# Patient Record
Sex: FEMALE | Race: WHITE | NOT HISPANIC OR LATINO | Employment: FULL TIME | URBAN - METROPOLITAN AREA
[De-identification: names, ages, dates, MRNs, and addresses within clinical notes are randomized per-mention and may not be internally consistent; named-entity substitution may affect disease eponyms.]

---

## 2019-10-25 ENCOUNTER — TELEPHONE (OUTPATIENT)
Dept: SURGERY | Facility: CLINIC | Age: 54
End: 2019-10-25

## 2019-10-25 NOTE — TELEPHONE ENCOUNTER
Patient returned call reference to a referral to Colorectal Surgery for colon cancer screening, patient refused.

## 2019-10-25 NOTE — TELEPHONE ENCOUNTER
----- Message from Marbella Dietrich sent at 10/25/2019  8:44 AM CDT -----  Regarding: [facility] appt   Hi Dr mccarty and staff,     DAYLIN Morrow is requesting a colonoscopy at the [facility] location.     Can you contact then to schedule an appt?     The order and records are in media mgr.     Thank you,  Marbella Dietrich   [facility]    [id number]

## 2019-10-25 NOTE — TELEPHONE ENCOUNTER
Called patient at all available numbers in reference to a referral to Colorectal Surgery for colon cancer screening, left message.

## 2020-11-23 ENCOUNTER — OFFICE VISIT (OUTPATIENT)
Dept: URGENT CARE | Facility: CLINIC | Age: 55
End: 2020-11-23
Payer: COMMERCIAL

## 2020-11-23 VITALS
OXYGEN SATURATION: 97 % | BODY MASS INDEX: 32.96 KG/M2 | WEIGHT: 210 LBS | TEMPERATURE: 98 F | HEIGHT: 67 IN | DIASTOLIC BLOOD PRESSURE: 86 MMHG | HEART RATE: 103 BPM | SYSTOLIC BLOOD PRESSURE: 136 MMHG | RESPIRATION RATE: 16 BRPM

## 2020-11-23 DIAGNOSIS — J01.90 ACUTE SINUSITIS, RECURRENCE NOT SPECIFIED, UNSPECIFIED LOCATION: Primary | ICD-10-CM

## 2020-11-23 DIAGNOSIS — J06.9 UPPER RESPIRATORY TRACT INFECTION, UNSPECIFIED TYPE: ICD-10-CM

## 2020-11-23 DIAGNOSIS — J40 BRONCHITIS: ICD-10-CM

## 2020-11-23 LAB
CTP QC/QA: YES
SARS-COV-2 RDRP RESP QL NAA+PROBE: NEGATIVE

## 2020-11-23 PROCEDURE — U0002: ICD-10-PCS | Mod: QW,S$GLB,, | Performed by: PHYSICIAN ASSISTANT

## 2020-11-23 PROCEDURE — 99203 OFFICE O/P NEW LOW 30 MIN: CPT | Mod: 25,S$GLB,CS, | Performed by: PHYSICIAN ASSISTANT

## 2020-11-23 PROCEDURE — U0002 COVID-19 LAB TEST NON-CDC: HCPCS | Mod: QW,S$GLB,, | Performed by: PHYSICIAN ASSISTANT

## 2020-11-23 PROCEDURE — 3008F BODY MASS INDEX DOCD: CPT | Mod: CPTII,S$GLB,, | Performed by: PHYSICIAN ASSISTANT

## 2020-11-23 PROCEDURE — 96372 PR INJECTION,THERAP/PROPH/DIAG2ST, IM OR SUBCUT: ICD-10-PCS | Mod: S$GLB,,, | Performed by: PHYSICIAN ASSISTANT

## 2020-11-23 PROCEDURE — 96372 THER/PROPH/DIAG INJ SC/IM: CPT | Mod: S$GLB,,, | Performed by: PHYSICIAN ASSISTANT

## 2020-11-23 PROCEDURE — 3008F PR BODY MASS INDEX (BMI) DOCUMENTED: ICD-10-PCS | Mod: CPTII,S$GLB,, | Performed by: PHYSICIAN ASSISTANT

## 2020-11-23 PROCEDURE — 99203 PR OFFICE/OUTPT VISIT, NEW, LEVL III, 30-44 MIN: ICD-10-PCS | Mod: 25,S$GLB,CS, | Performed by: PHYSICIAN ASSISTANT

## 2020-11-23 RX ORDER — AZITHROMYCIN 250 MG/1
TABLET, FILM COATED ORAL
Qty: 6 TABLET | Refills: 0 | Status: SHIPPED | OUTPATIENT
Start: 2020-11-23 | End: 2021-06-03 | Stop reason: CLARIF

## 2020-11-23 RX ORDER — BENZONATATE 200 MG/1
200 CAPSULE ORAL 3 TIMES DAILY PRN
Qty: 30 CAPSULE | Refills: 0 | Status: SHIPPED | OUTPATIENT
Start: 2020-11-23 | End: 2020-12-03

## 2020-11-23 RX ORDER — BETAMETHASONE SODIUM PHOSPHATE AND BETAMETHASONE ACETATE 3; 3 MG/ML; MG/ML
6 INJECTION, SUSPENSION INTRA-ARTICULAR; INTRALESIONAL; INTRAMUSCULAR; SOFT TISSUE
Status: COMPLETED | OUTPATIENT
Start: 2020-11-23 | End: 2020-11-23

## 2020-11-23 RX ORDER — ALBUTEROL SULFATE 90 UG/1
1-2 AEROSOL, METERED RESPIRATORY (INHALATION) EVERY 6 HOURS PRN
Qty: 18 G | Refills: 0 | Status: SHIPPED | OUTPATIENT
Start: 2020-11-23 | End: 2021-11-23

## 2020-11-23 RX ORDER — PROMETHAZINE HYDROCHLORIDE AND DEXTROMETHORPHAN HYDROBROMIDE 6.25; 15 MG/5ML; MG/5ML
5 SYRUP ORAL NIGHTLY PRN
Qty: 118 ML | Refills: 0 | Status: SHIPPED | OUTPATIENT
Start: 2020-11-23 | End: 2020-12-03

## 2020-11-23 RX ADMIN — BETAMETHASONE SODIUM PHOSPHATE AND BETAMETHASONE ACETATE 6 MG: 3; 3 INJECTION, SUSPENSION INTRA-ARTICULAR; INTRALESIONAL; INTRAMUSCULAR; SOFT TISSUE at 12:11

## 2020-11-23 NOTE — PROGRESS NOTES
"Subjective:       Patient ID: Stefanie Peña is a 55 y.o. female.    Vitals:  height is 5' 7" (1.702 m) and weight is 95.3 kg (210 lb). Her temperature is 97.9 °F (36.6 °C). Her blood pressure is 136/86 and her pulse is 103. Her respiration is 16 and oxygen saturation is 97%.     Chief Complaint: URI    Pt presents for covid test for work. She states that last week she began experiencing sinus congestion which has been gradually worsening since onset. Over the past few days she has developed a worsening cough. No fever or shortness of breath. She states that she has a history of recurrent bronchitis, she is a smoker. She states that typically a steroid, abx, and inhaler will help with bronchitis flares. She has taken otc tylenol cold medication with very mild relief temporarily.     URI   This is a new problem. The current episode started 1 to 4 weeks ago. The problem has been gradually worsening. There has been no fever. Associated symptoms include congestion, coughing and sinus pain. Pertinent negatives include no abdominal pain, chest pain, dysuria, ear pain, headaches, nausea, rash, sore throat, vomiting or wheezing. Treatments tried: tylenol cold and flu  The treatment provided mild relief.       Constitution: Negative for chills, sweating, fatigue, fever and unexpected weight change.   HENT: Positive for congestion, postnasal drip, sinus pain and sinus pressure. Negative for ear pain, sore throat, trouble swallowing and voice change.    Neck: Negative for painful lymph nodes.   Cardiovascular: Negative for chest pain, leg swelling, palpitations and sob on exertion.   Eyes: Negative for eye redness.   Respiratory: Positive for chest tightness, cough and sputum production. Negative for bloody sputum, COPD, shortness of breath, stridor, wheezing and asthma.    Gastrointestinal: Negative for abdominal pain, nausea and vomiting.   Genitourinary: Negative for dysuria, frequency, urgency and flank pain. "   Musculoskeletal: Negative for pain and muscle ache.   Skin: Negative for rash.   Allergic/Immunologic: Negative for seasonal allergies and asthma.   Neurological: Negative for dizziness and headaches.   Hematologic/Lymphatic: Negative for swollen lymph nodes.       Objective:      Physical Exam   Constitutional: She is oriented to person, place, and time. She appears well-developed. She is cooperative.  Non-toxic appearance. She does not appear ill. No distress.      Comments:Very pleasant sitting comfortably in nad.   HENT:   Head: Normocephalic and atraumatic.   Ears:   Right Ear: Hearing normal.   Left Ear: Hearing normal.   Nose: Mucosal edema present. No rhinorrhea, purulent discharge or nasal deformity. No epistaxis. Right sinus exhibits maxillary sinus tenderness and frontal sinus tenderness. Left sinus exhibits maxillary sinus tenderness and frontal sinus tenderness.   Mouth/Throat: Uvula is midline, oropharynx is clear and moist and mucous membranes are normal. No trismus in the jaw. Normal dentition. No uvula swelling. No oropharyngeal exudate, posterior oropharyngeal edema, posterior oropharyngeal erythema, tonsillar abscesses or cobblestoning. Tonsils are 1+ on the right. Tonsils are 1+ on the left. No tonsillar exudate.   Eyes: Conjunctivae and lids are normal. No scleral icterus.   Neck: Trachea normal, full passive range of motion without pain and phonation normal. Neck supple. No neck rigidity. No edema and no erythema present.   Cardiovascular: Normal rate, regular rhythm, normal heart sounds and normal pulses.   Pulmonary/Chest: Effort normal. No accessory muscle usage or stridor. No tachypnea and no bradypnea. No respiratory distress. She has no decreased breath sounds. She has wheezes (faint end expiratory) in the right middle field and the left middle field. She has no rhonchi. She has no rales.   No evidence of resp distress. Cough throughout exam, elicited with deep breaths.     Comments: No  evidence of resp distress. Cough throughout exam, elicited with deep breaths.     Abdominal: Normal appearance.   Musculoskeletal: Normal range of motion.         General: No deformity.   Lymphadenopathy:        Head (right side): No submandibular, no preauricular and no posterior auricular adenopathy present.        Head (left side): No submandibular, no preauricular and no posterior auricular adenopathy present.     She has no cervical adenopathy.   Neurological: She is alert and oriented to person, place, and time. She exhibits normal muscle tone. Coordination normal.   Skin: Skin is warm, dry, intact, not diaphoretic and not pale. Psychiatric: Her speech is normal and behavior is normal. Judgment and thought content normal.   Nursing note and vitals reviewed.        Results for orders placed or performed in visit on 11/23/20   POCT COVID-19 Rapid Screening   Result Value Ref Range    POC Rapid COVID Negative Negative     Acceptable Yes        Assessment:       1. Acute sinusitis, recurrence not specified, unspecified location    2. Upper respiratory tract infection, unspecified type    3. Bronchitis        Plan:         Acute sinusitis, recurrence not specified, unspecified location  -     azithromycin (Z-ZHANE) 250 MG tablet; Take 2 tablets by mouth on day 1; Take 1 tablet by mouth on days 2-5  Dispense: 6 tablet; Refill: 0    Upper respiratory tract infection, unspecified type  -     POCT COVID-19 Rapid Screening    Bronchitis  -     betamethasone acetate-betamethasone sodium phosphate injection 6 mg  -     albuterol (PROVENTIL/VENTOLIN HFA) 90 mcg/actuation inhaler; Inhale 1-2 puffs into the lungs every 6 (six) hours as needed for Wheezing. Rescue  Dispense: 18 g; Refill: 0  -     benzonatate (TESSALON) 200 MG capsule; Take 1 capsule (200 mg total) by mouth 3 (three) times daily as needed for Cough.  Dispense: 30 capsule; Refill: 0  -     promethazine-dextromethorphan (PROMETHAZINE-DM) 6.25-15  mg/5 mL Syrp; Take 5 mLs by mouth nightly as needed.  Dispense: 118 mL; Refill: 0

## 2020-11-23 NOTE — PATIENT INSTRUCTIONS
- Rest.    - Drink plenty of fluids.      - Viral upper respiratory infections typically run their course in 10-14 days.     - Tylenol or Ibuprofen as directed as needed for fever/pain. Avoid tylenol if you have a history of liver disease. Do not take ibuprofen if you have a history of GI bleeding, kidney disease, or if you take blood thinners.     - You can take over-the-counter claritin, zyrtec, allegra, or xyzal as directed. These are antihistamines that can help with runny nose, nasal congestion, sneezing, and helps to dry up post-nasal drip, which usually causes sore throat and cough.   - If you do NOT have high blood pressure, you may use a decongestant form (D)  of this medication or if you do not take the D form, you can take sudafed  (pseudoephedrine) over the counter, which is a decongestant.    - You can use Flonase (fluticasone) nasal spray as directed for sinus congestion and postnasal drip. This is a steroid nasal spray that works locally over time to decrease the inflammation in your nose/sinuses and help with allergic symptoms. This is not an quick- relief spray like afrin, but it works well if used daily.  Discontinue if you develop nose bleed  - use nasal saline prior to Flonase.    - Use Ocean Spray Nasal Saline 1-3 puffs each nostril every 2-3 hours then blow out onto tissue. This is to irrigate the nasal passage way to clear the sinus openings. Use until sinus problem resolved.    - You received a steroid shot today.  This can elevate your blood pressure, elevate your blood sugar, water weight gain, nervous energy, redness to the face and dimpling of the skin where the shot goes in.   - Do not use steroids more than 3 times per year.   - If you have diabetes, please check you blood sugar frequently.  - If you have high blood pressure, please check your blood pressure frequently.     - you can take plain Mucinex (guaifenesin) 1200 mg twice a day to help loosen mucous    -warm salt water gargles  can help with sore throat    - warm tea with honey can help with cough. Honey is a natural cough suppressant.    - use albuterol inhaler as needed, as prescribed for wheezing, shortness of breath, coughing spells, chest tightness.    - Take the tessalon (benzonatate) as needed as prescribed for cough   - Only take the promethazine- DM as directed, as needed at night for cough. This medication may cause drowsiness.     - Follow up with your PCP or specialty clinic as directed in the next 1-2 weeks if not improved or as needed.  You can call [phone] to schedule an appointment with the appropriate provider.      - Go to the ER if you develop new or worsening symptoms.     - You must understand that you have received an Urgent Care treatment only and that you may be released before all of your medical problems are known or treated.   - You, the patient, will arrange for follow up care as instructed.   - If your condition worsens or fails to improve we recommend that you receive another evaluation at the ER immediately or contact your PCP to discuss your concerns or return here.       You have tested negative for COVID-19 today.  If you did not have any close exposure as defined below, then effective today, you can return to your normal daily activities including social distancing, wearing masks, and frequent handwashing.    A close exposure is defined as anyone who had a masked or an unmasked exposure to a known COVID -19 positive person, at less than 6 ft for more than 15 minutes.  If your exposure meets this definition, then you are required to quarantine for 14 days per the CDC.    The 14 day quarantine begins from the day you were exposed, not the day of your test.  For example, if your exposure was on a Monday, and you waited until Friday of the same week to get tested and it was negative, your 14 day quarantine begins from that Monday, not the Friday you tested negative.    If you developed symptoms since  the exposure, and your test was negative today, you still have to quarantine for 14 days from the date of the exposure.    So if you meet the definition of a close exposure, A NEGATIVE TEST DOES NOT GET YOU OUT OF 14 DAYS OF QUARANTINE!

## 2021-06-03 PROBLEM — J44.1 COPD EXACERBATION: Status: ACTIVE | Noted: 2021-06-03

## 2021-06-03 PROBLEM — Z72.0 TOBACCO ABUSE: Status: ACTIVE | Noted: 2021-06-03

## 2021-06-03 PROBLEM — R09.02 HYPOXEMIA: Status: ACTIVE | Noted: 2021-06-03

## 2021-06-04 PROBLEM — J44.1 COPD EXACERBATION: Status: RESOLVED | Noted: 2021-06-03 | Resolved: 2021-06-04

## 2021-06-04 PROBLEM — R09.02 HYPOXEMIA: Status: RESOLVED | Noted: 2021-06-03 | Resolved: 2021-06-04

## 2021-06-18 ENCOUNTER — OFFICE VISIT (OUTPATIENT)
Dept: PULMONOLOGY | Facility: CLINIC | Age: 56
End: 2021-06-18
Payer: COMMERCIAL

## 2021-06-18 VITALS
WEIGHT: 216.19 LBS | BODY MASS INDEX: 33.93 KG/M2 | SYSTOLIC BLOOD PRESSURE: 157 MMHG | OXYGEN SATURATION: 96 % | HEIGHT: 67 IN | DIASTOLIC BLOOD PRESSURE: 89 MMHG | HEART RATE: 92 BPM

## 2021-06-18 DIAGNOSIS — R91.8 GROUND GLASS OPACITY PRESENT ON IMAGING OF LUNG: Primary | ICD-10-CM

## 2021-06-18 DIAGNOSIS — Z72.0 TOBACCO ABUSE: ICD-10-CM

## 2021-06-18 DIAGNOSIS — J43.2 CENTRILOBULAR EMPHYSEMA: ICD-10-CM

## 2021-06-18 DIAGNOSIS — R06.09 DYSPNEA ON EXERTION: ICD-10-CM

## 2021-06-18 PROCEDURE — 3008F BODY MASS INDEX DOCD: CPT | Mod: CPTII,S$GLB,, | Performed by: NURSE PRACTITIONER

## 2021-06-18 PROCEDURE — 1111F PR DISCHARGE MEDS RECONCILED W/ CURRENT OUTPATIENT MED LIST: ICD-10-PCS | Mod: CPTII,S$GLB,, | Performed by: NURSE PRACTITIONER

## 2021-06-18 PROCEDURE — 3008F PR BODY MASS INDEX (BMI) DOCUMENTED: ICD-10-PCS | Mod: CPTII,S$GLB,, | Performed by: NURSE PRACTITIONER

## 2021-06-18 PROCEDURE — 99204 PR OFFICE/OUTPT VISIT, NEW, LEVL IV, 45-59 MIN: ICD-10-PCS | Mod: S$GLB,,, | Performed by: NURSE PRACTITIONER

## 2021-06-18 PROCEDURE — 99999 PR PBB SHADOW E&M-EST. PATIENT-LVL IV: CPT | Mod: PBBFAC,,, | Performed by: NURSE PRACTITIONER

## 2021-06-18 PROCEDURE — 1111F DSCHRG MED/CURRENT MED MERGE: CPT | Mod: CPTII,S$GLB,, | Performed by: NURSE PRACTITIONER

## 2021-06-18 PROCEDURE — 99204 OFFICE O/P NEW MOD 45 MIN: CPT | Mod: S$GLB,,, | Performed by: NURSE PRACTITIONER

## 2021-06-18 PROCEDURE — 99999 PR PBB SHADOW E&M-EST. PATIENT-LVL IV: ICD-10-PCS | Mod: PBBFAC,,, | Performed by: NURSE PRACTITIONER

## 2021-06-18 PROCEDURE — 1126F AMNT PAIN NOTED NONE PRSNT: CPT | Mod: S$GLB,,, | Performed by: NURSE PRACTITIONER

## 2021-06-18 PROCEDURE — 1126F PR PAIN SEVERITY QUANTIFIED, NO PAIN PRESENT: ICD-10-PCS | Mod: S$GLB,,, | Performed by: NURSE PRACTITIONER

## 2021-06-18 RX ORDER — MULTIVITAMIN
1 TABLET ORAL DAILY
COMMUNITY

## 2021-06-18 RX ORDER — PROMETHAZINE HYDROCHLORIDE AND DEXTROMETHORPHAN HYDROBROMIDE 6.25; 15 MG/5ML; MG/5ML
SYRUP ORAL
COMMUNITY

## 2021-06-29 RX ORDER — BUDESONIDE, GLYCOPYRROLATE, AND FORMOTEROL FUMARATE 160; 9; 4.8 UG/1; UG/1; UG/1
2 AEROSOL, METERED RESPIRATORY (INHALATION) 2 TIMES DAILY
Qty: 10.7 G | Refills: 11 | Status: SHIPPED | OUTPATIENT
Start: 2021-06-29 | End: 2022-05-25 | Stop reason: SDUPTHER

## 2021-09-20 DIAGNOSIS — R91.1 SOLITARY PULMONARY NODULE: ICD-10-CM

## 2021-09-21 ENCOUNTER — PATIENT MESSAGE (OUTPATIENT)
Dept: PULMONOLOGY | Facility: CLINIC | Age: 56
End: 2021-09-21

## 2022-03-17 ENCOUNTER — OFFICE VISIT (OUTPATIENT)
Dept: OBSTETRICS AND GYNECOLOGY | Facility: CLINIC | Age: 57
End: 2022-03-17
Payer: COMMERCIAL

## 2022-03-17 VITALS — DIASTOLIC BLOOD PRESSURE: 80 MMHG | WEIGHT: 215.81 LBS | BODY MASS INDEX: 33.8 KG/M2 | SYSTOLIC BLOOD PRESSURE: 126 MMHG

## 2022-03-17 DIAGNOSIS — N89.8 VAGINAL ODOR: ICD-10-CM

## 2022-03-17 DIAGNOSIS — R31.9 HEMATURIA, UNSPECIFIED TYPE: ICD-10-CM

## 2022-03-17 DIAGNOSIS — R35.0 URINARY FREQUENCY: ICD-10-CM

## 2022-03-17 DIAGNOSIS — Z01.419 ENCOUNTER FOR WELL WOMAN EXAM WITH ROUTINE GYNECOLOGICAL EXAM: Primary | ICD-10-CM

## 2022-03-17 LAB
BILIRUB SERPL-MCNC: ABNORMAL MG/DL
BLOOD, POC UA: ABNORMAL
GLUCOSE UR QL STRIP: NORMAL
KETONES UR QL STRIP: ABNORMAL
LEUKOCYTE ESTERASE URINE, POC: ABNORMAL
NITRITE, POC UA: ABNORMAL
PH, POC UA: 5
PROTEIN, POC: ABNORMAL
SPECIFIC GRAVITY, POC UA: 1
UROBILINOGEN, POC UA: NORMAL

## 2022-03-17 PROCEDURE — 87481 CANDIDA DNA AMP PROBE: CPT | Mod: 59 | Performed by: NURSE PRACTITIONER

## 2022-03-17 PROCEDURE — 3074F SYST BP LT 130 MM HG: CPT | Mod: CPTII,S$GLB,, | Performed by: NURSE PRACTITIONER

## 2022-03-17 PROCEDURE — 3008F BODY MASS INDEX DOCD: CPT | Mod: CPTII,S$GLB,, | Performed by: NURSE PRACTITIONER

## 2022-03-17 PROCEDURE — 1159F PR MEDICATION LIST DOCUMENTED IN MEDICAL RECORD: ICD-10-PCS | Mod: CPTII,S$GLB,, | Performed by: NURSE PRACTITIONER

## 2022-03-17 PROCEDURE — 1160F PR REVIEW ALL MEDS BY PRESCRIBER/CLIN PHARMACIST DOCUMENTED: ICD-10-PCS | Mod: CPTII,S$GLB,, | Performed by: NURSE PRACTITIONER

## 2022-03-17 PROCEDURE — 3079F DIAST BP 80-89 MM HG: CPT | Mod: CPTII,S$GLB,, | Performed by: NURSE PRACTITIONER

## 2022-03-17 PROCEDURE — 99386 PR PREVENTIVE VISIT,NEW,40-64: ICD-10-PCS | Mod: 25,S$GLB,, | Performed by: NURSE PRACTITIONER

## 2022-03-17 PROCEDURE — 3079F PR MOST RECENT DIASTOLIC BLOOD PRESSURE 80-89 MM HG: ICD-10-PCS | Mod: CPTII,S$GLB,, | Performed by: NURSE PRACTITIONER

## 2022-03-17 PROCEDURE — 87186 SC STD MICRODIL/AGAR DIL: CPT | Performed by: NURSE PRACTITIONER

## 2022-03-17 PROCEDURE — 99999 PR PBB SHADOW E&M-EST. PATIENT-LVL III: ICD-10-PCS | Mod: PBBFAC,,, | Performed by: NURSE PRACTITIONER

## 2022-03-17 PROCEDURE — 3008F PR BODY MASS INDEX (BMI) DOCUMENTED: ICD-10-PCS | Mod: CPTII,S$GLB,, | Performed by: NURSE PRACTITIONER

## 2022-03-17 PROCEDURE — 1159F MED LIST DOCD IN RCRD: CPT | Mod: CPTII,S$GLB,, | Performed by: NURSE PRACTITIONER

## 2022-03-17 PROCEDURE — 87077 CULTURE AEROBIC IDENTIFY: CPT | Performed by: NURSE PRACTITIONER

## 2022-03-17 PROCEDURE — 87086 URINE CULTURE/COLONY COUNT: CPT | Performed by: NURSE PRACTITIONER

## 2022-03-17 PROCEDURE — 81003 URINALYSIS AUTO W/O SCOPE: CPT | Mod: QW,S$GLB,, | Performed by: NURSE PRACTITIONER

## 2022-03-17 PROCEDURE — 3074F PR MOST RECENT SYSTOLIC BLOOD PRESSURE < 130 MM HG: ICD-10-PCS | Mod: CPTII,S$GLB,, | Performed by: NURSE PRACTITIONER

## 2022-03-17 PROCEDURE — 87624 HPV HI-RISK TYP POOLED RSLT: CPT | Performed by: NURSE PRACTITIONER

## 2022-03-17 PROCEDURE — 81003 POCT URINALYSIS: ICD-10-PCS | Mod: QW,S$GLB,, | Performed by: NURSE PRACTITIONER

## 2022-03-17 PROCEDURE — 87088 URINE BACTERIA CULTURE: CPT | Performed by: NURSE PRACTITIONER

## 2022-03-17 PROCEDURE — 88175 CYTOPATH C/V AUTO FLUID REDO: CPT | Performed by: NURSE PRACTITIONER

## 2022-03-17 PROCEDURE — 99386 PREV VISIT NEW AGE 40-64: CPT | Mod: 25,S$GLB,, | Performed by: NURSE PRACTITIONER

## 2022-03-17 PROCEDURE — 1160F RVW MEDS BY RX/DR IN RCRD: CPT | Mod: CPTII,S$GLB,, | Performed by: NURSE PRACTITIONER

## 2022-03-17 PROCEDURE — 99999 PR PBB SHADOW E&M-EST. PATIENT-LVL III: CPT | Mod: PBBFAC,,, | Performed by: NURSE PRACTITIONER

## 2022-03-17 RX ORDER — TIOTROPIUM BROMIDE 18 UG/1
CAPSULE ORAL; RESPIRATORY (INHALATION) DAILY
COMMUNITY
Start: 2022-02-24

## 2022-03-17 NOTE — PROGRESS NOTES
Chief Complaint: Well Woman Exam     HPI:      Stefanie Peña is a 56 y.o.  who presents today for well woman exam. Patient is postmenopausal.  Denies PMB.  Patient is not currently sexually active. She declines STD screening today. Ms. Peña confirms that she is safe at home.  Ms. Peña denies abnormal vaginal bleeding, discharge, pelvic pain, or changes in appetite.  Complains of intermittent vaginal odor. Denies discharge or itching.  Complains of frequent urination. Denies dysuria and hematuria.     Previous Pap:  no abnormalities () Reports hx of abnormal paps that was follow by an outpatient procedure. Patient only knows that the MD 'took 3/4 of her cervix.' The procedure was in 8433-0250 at [facility].  Previous Mammogram: WNL per pt. Completed at [facility] (10/2021)   Colonoscopy: cologuard negative (2021)    Family History   Problem Relation Age of Onset    Breast cancer Neg Hx     Colon cancer Neg Hx     Ovarian cancer Neg Hx      OB History        2    Para   2    Term   2            AB        Living           SAB        IAB        Ectopic        Multiple        Live Births                     ROS:     GENERAL: Denies unintentional weight gain or weight loss. Feeling well overall.   BREASTS: Denies pain, lumps, or nipple discharge.   ABDOMEN: Denies abdominal pain, constipation, diarrhea, nausea, vomiting, change in appetite.  URINARY: Denies dysuria, hematuria.  PSYCHIATRIC: Denies depression, anxiety or mood swings.    Physical Exam:      PHYSICAL EXAM:  /80   Wt 97.9 kg (215 lb 13.3 oz)   BMI 33.80 kg/m²   Body mass index is 33.8 kg/m².     APPEARANCE: Well nourished, well developed, in no acute distress.  PSYCH: Appropriate mood and affect.  ABDOMEN: Soft.  No tenderness or masses.    BREASTS: Symmetrical, no skin changes or visible lesions.  No palpable masses or nipple discharge bilaterally.  PELVIC: Normal external genitalia without lesions.  Normal hair  distribution.  Adequate perineal body, normal urethral meatus.  Vagina atrophic without lesions or discharge.  Cervix appears absent with no clear cervical os. Possible vaginal cuff?   No significant cystocele or rectocele.  Uterus and cervix not identified on bimanual. Bimanual exam shows Adnexa without masses or tenderness.      Assessment/Plan:     Encounter for well woman exam with routine gynecological exam  -     Liquid-Based Pap Smear, Screening  -     HPV High Risk Genotypes, PCR    Vaginal odor  -     Vaginosis Screen by DNA Probe    Urinary frequency  -     POCT Urinalysis        Counseling:     Patient was counseled today on current ASCCP pap guidelines, the recommendation for yearly pelvic exams, healthy diet and exercise routines, breast self awareness and annual mammograms.She is to see her PCP for other health maintenance.     Request records from [facility] to determine exact procedure completed.

## 2022-03-18 DIAGNOSIS — N39.0 URINARY TRACT INFECTION WITH HEMATURIA, SITE UNSPECIFIED: Primary | ICD-10-CM

## 2022-03-18 DIAGNOSIS — R31.9 URINARY TRACT INFECTION WITH HEMATURIA, SITE UNSPECIFIED: Primary | ICD-10-CM

## 2022-03-18 LAB
BACTERIAL VAGINOSIS DNA: NEGATIVE
CANDIDA GLABRATA DNA: NEGATIVE
CANDIDA KRUSEI DNA: NEGATIVE
CANDIDA RRNA VAG QL PROBE: NEGATIVE
T VAGINALIS RRNA GENITAL QL PROBE: NEGATIVE

## 2022-03-18 RX ORDER — NITROFURANTOIN 25; 75 MG/1; MG/1
100 CAPSULE ORAL 2 TIMES DAILY
Qty: 10 CAPSULE | Refills: 0 | Status: SHIPPED | OUTPATIENT
Start: 2022-03-18 | End: 2022-03-23

## 2022-03-20 LAB — BACTERIA UR CULT: ABNORMAL

## 2022-03-24 LAB
CLINICAL INFO: NORMAL
CYTO CVX: NORMAL
CYTOLOGIST CVX/VAG CYTO: NORMAL
CYTOLOGIST CVX/VAG CYTO: NORMAL
CYTOLOGY CMNT CVX/VAG CYTO-IMP: NORMAL
CYTOLOGY PAP THIN PREP EXPLANATION: NORMAL
DATE OF PREVIOUS PAP: NORMAL
DATE PREVIOUS BX: YES
GEN CATEG CVX/VAG CYTO-IMP: NORMAL
HPV I/H RISK 4 DNA CVX QL NAA+PROBE: NOT DETECTED
LMP START DATE: NORMAL
MICROORGANISM CVX/VAG CYTO: NORMAL
PATHOLOGIST CVX/VAG CYTO: NORMAL
SERVICE CMNT-IMP: NORMAL
SPECIMEN SOURCE CVX/VAG CYTO: NORMAL
STAT OF ADQ CVX/VAG CYTO-IMP: NORMAL

## 2022-05-25 ENCOUNTER — PATIENT MESSAGE (OUTPATIENT)
Dept: CARDIOLOGY | Facility: CLINIC | Age: 57
End: 2022-05-25
Payer: COMMERCIAL

## 2022-05-25 RX ORDER — BUDESONIDE, GLYCOPYRROLATE, AND FORMOTEROL FUMARATE 160; 9; 4.8 UG/1; UG/1; UG/1
2 AEROSOL, METERED RESPIRATORY (INHALATION) 2 TIMES DAILY
Qty: 10.7 G | Refills: 11 | Status: CANCELLED | OUTPATIENT
Start: 2022-05-25

## 2022-05-25 RX ORDER — BUDESONIDE, GLYCOPYRROLATE, AND FORMOTEROL FUMARATE 160; 9; 4.8 UG/1; UG/1; UG/1
2 AEROSOL, METERED RESPIRATORY (INHALATION) 2 TIMES DAILY
Qty: 10.7 G | Refills: 11 | Status: SHIPPED | OUTPATIENT
Start: 2022-05-25 | End: 2023-09-24 | Stop reason: SDUPTHER

## 2023-09-25 RX ORDER — BUDESONIDE, GLYCOPYRROLATE, AND FORMOTEROL FUMARATE 160; 9; 4.8 UG/1; UG/1; UG/1
2 AEROSOL, METERED RESPIRATORY (INHALATION) 2 TIMES DAILY
Qty: 10.7 G | Refills: 11 | Status: SHIPPED | OUTPATIENT
Start: 2023-09-25

## 2024-08-19 ENCOUNTER — HOSPITAL ENCOUNTER (OUTPATIENT)
Dept: RADIOLOGY | Facility: HOSPITAL | Age: 59
Discharge: HOME | End: 2024-08-19
Attending: NURSE PRACTITIONER
Payer: COMMERCIAL

## 2024-08-19 DIAGNOSIS — Z12.31 ENCOUNTER FOR SCREENING MAMMOGRAM FOR BREAST CANCER: ICD-10-CM

## 2024-08-19 PROCEDURE — 77067 SCR MAMMO BI INCL CAD: CPT | Mod: TC

## 2024-09-03 ENCOUNTER — OFFICE VISIT (OUTPATIENT)
Dept: OBSTETRICS AND GYNECOLOGY | Facility: CLINIC | Age: 59
End: 2024-09-03
Payer: COMMERCIAL

## 2024-09-03 VITALS
HEIGHT: 67 IN | SYSTOLIC BLOOD PRESSURE: 142 MMHG | WEIGHT: 189.13 LBS | BODY MASS INDEX: 29.69 KG/M2 | HEART RATE: 74 BPM | DIASTOLIC BLOOD PRESSURE: 100 MMHG

## 2024-09-03 DIAGNOSIS — Z01.419 WELL WOMAN EXAM WITH ROUTINE GYNECOLOGICAL EXAM: Primary | ICD-10-CM

## 2024-09-03 DIAGNOSIS — Z12.4 SCREENING FOR CERVICAL CANCER: ICD-10-CM

## 2024-09-03 DIAGNOSIS — N95.1 MENOPAUSAL SYMPTOMS: ICD-10-CM

## 2024-09-03 DIAGNOSIS — Z76.89 ENCOUNTER TO ESTABLISH CARE: ICD-10-CM

## 2024-09-03 DIAGNOSIS — Z87.42 H/O ABNORMAL CERVICAL PAPANICOLAOU SMEAR: ICD-10-CM

## 2024-09-03 DIAGNOSIS — Z11.3 SCREENING FOR STDS (SEXUALLY TRANSMITTED DISEASES): ICD-10-CM

## 2024-09-03 DIAGNOSIS — R35.0 URINATION FREQUENCY: ICD-10-CM

## 2024-09-03 LAB
BILIRUB SERPL-MCNC: NORMAL MG/DL
BLOOD URINE, POC: NORMAL
CLARITY, POC UA: CLEAR
COLOR, POC UA: YELLOW
GLUCOSE UR QL STRIP: NORMAL
KETONES UR QL STRIP: NORMAL
LEUKOCYTE ESTERASE URINE, POC: NORMAL
NITRITE, POC UA: NORMAL
PH, POC UA: 7.5
PROTEIN, POC: NORMAL
SPECIFIC GRAVITY, POC UA: 1.01
UROBILINOGEN, POC UA: 0.2

## 2024-09-03 PROCEDURE — 1160F RVW MEDS BY RX/DR IN RCRD: CPT | Mod: CPTII,S$GLB,, | Performed by: OBSTETRICS & GYNECOLOGY

## 2024-09-03 PROCEDURE — 87491 CHLMYD TRACH DNA AMP PROBE: CPT | Performed by: OBSTETRICS & GYNECOLOGY

## 2024-09-03 PROCEDURE — 81002 URINALYSIS NONAUTO W/O SCOPE: CPT | Mod: S$GLB,,, | Performed by: OBSTETRICS & GYNECOLOGY

## 2024-09-03 PROCEDURE — 3077F SYST BP >= 140 MM HG: CPT | Mod: CPTII,S$GLB,, | Performed by: OBSTETRICS & GYNECOLOGY

## 2024-09-03 PROCEDURE — 99999 PR PBB SHADOW E&M-EST. PATIENT-LVL V: CPT | Mod: PBBFAC,,, | Performed by: OBSTETRICS & GYNECOLOGY

## 2024-09-03 PROCEDURE — 1159F MED LIST DOCD IN RCRD: CPT | Mod: CPTII,S$GLB,, | Performed by: OBSTETRICS & GYNECOLOGY

## 2024-09-03 PROCEDURE — 87624 HPV HI-RISK TYP POOLED RSLT: CPT | Performed by: OBSTETRICS & GYNECOLOGY

## 2024-09-03 PROCEDURE — 99396 PREV VISIT EST AGE 40-64: CPT | Mod: S$GLB,,, | Performed by: OBSTETRICS & GYNECOLOGY

## 2024-09-03 PROCEDURE — 3080F DIAST BP >= 90 MM HG: CPT | Mod: CPTII,S$GLB,, | Performed by: OBSTETRICS & GYNECOLOGY

## 2024-09-03 PROCEDURE — 3008F BODY MASS INDEX DOCD: CPT | Mod: CPTII,S$GLB,, | Performed by: OBSTETRICS & GYNECOLOGY

## 2024-09-03 RX ORDER — NITROFURANTOIN 25; 75 MG/1; MG/1
CAPSULE ORAL
COMMUNITY

## 2024-09-03 RX ORDER — MECLIZINE HYDROCHLORIDE 25 MG/1
TABLET ORAL
COMMUNITY

## 2024-09-03 RX ORDER — PREDNISONE 20 MG/1
TABLET ORAL
COMMUNITY

## 2024-09-03 RX ORDER — ALBUTEROL SULFATE 90 UG/1
INHALANT RESPIRATORY (INHALATION)
COMMUNITY

## 2024-09-03 RX ORDER — DOXYCYCLINE 100 MG/1
1 CAPSULE ORAL 2 TIMES DAILY
COMMUNITY

## 2024-09-03 RX ORDER — IBUPROFEN 800 MG/1
1 TABLET ORAL EVERY 6 HOURS PRN
COMMUNITY

## 2024-09-03 RX ORDER — BENZONATATE 100 MG/1
CAPSULE ORAL
COMMUNITY

## 2024-09-03 RX ORDER — CICLOPIROX 80 MG/ML
SOLUTION TOPICAL
COMMUNITY

## 2024-09-03 RX ORDER — BENZONATATE 200 MG/1
CAPSULE ORAL
COMMUNITY

## 2024-09-03 RX ORDER — AMOXICILLIN 500 MG/1
TABLET, FILM COATED ORAL
COMMUNITY
Start: 2024-04-05

## 2024-09-03 NOTE — PROGRESS NOTES
"Subjective     Patient ID: Stefanie Peña is a 58 y.o. female.    Chief Complaint:  Well Woman      History of Present Illness  HPI  57yo  presents for annual exam.  Overall doing well.  On last exam, patient noted to have possibly no cervix, just vaginal cuff.  Upon further questioning, patient reports h/o partial removal of cervix in , but she is uncertain what all that entailed.  Believes she still has uterus and ovaries, but is unsure.  Perimenopausal sx since age 47, stopped having cycles in early 50s.  Still has occ HF/NS, night sweats worse when she is working at night and physically active.  Also reports urinary frequency.  Denies dysuria or incontinence.  No PMB.  Not sexually active.  No other complaints today.  Up to date on MMG.  Reports cologuard in , has appt with new PCP , but would prefer to switch to [facility].     GYN & OB History  No LMP recorded. Patient is postmenopausal.   Date of Last Pap: 3/24/2022    OB History    Para Term  AB Living   2 2 2         SAB IAB Ectopic Multiple Live Births                  # Outcome Date GA Lbr Andi/2nd Weight Sex Type Anes PTL Lv   2 Term            1 Term                Review of Systems  Review of Systems   Constitutional:  Negative for chills and fever.   Respiratory:  Negative for cough and shortness of breath.    Cardiovascular:  Negative for chest pain.   Gastrointestinal:  Positive for constipation (diet related). Negative for abdominal pain and diarrhea.   Genitourinary:  Positive for frequency. Negative for dysuria, pelvic pain, vaginal discharge, postmenopausal bleeding and vaginal dryness.   Musculoskeletal:  Negative for myalgias.   Neurological:  Negative for headaches.          Objective   Physical Exam    BP (!) 142/100 (BP Location: Left arm, Patient Position: Sitting, BP Method: Large (Automatic))   Pulse 74   Ht 5' 7" (1.702 m)   Wt 85.8 kg (189 lb 2.5 oz)   BMI 29.63 kg/m²     Gen: NAD  Resp: Normal " respiratory effort  Breast: Symmetric, nontender.  No masses.  No skin changes.  No nipple discharge.   Abd: soft, NT  Pelvic: Normal-appearing external female genitalia.  Atrophic cervix flushed with vaginal wall noted vs possible cuff, as previously documented exam.  However, possible small uterus noted on bimanual exam, nontender.  Slight fullness noted in RLQ.  No adnexal masses or tenderness.    Ext: normal ROM  Psych: appropriate affect  Neuro: grossly intact       Assessment and Plan   Stefanie was seen today for well woman.    Diagnoses and all orders for this visit:    Well woman exam with routine gynecological exam    H/O abnormal cervical Papanicolaou smear  -     US Pelvis Comp with Transvag NON-OB (xpd; Future    Menopausal symptoms  -     US Pelvis Comp with Transvag NON-OB (xpd; Future    Urination frequency  -     POCT URINE DIPSTICK WITHOUT MICROSCOPE    Screening for STDs (sexually transmitted diseases)  -     HIV 1/2 Ag/Ab (4th Gen); Future  -     Treponema Pallidium Antibodies IgG, IgM; Future  -     C. trachomatis/N. gonorrhoeae by AMP DNA [facility]; Vagina    Encounter to establish care  -     Ambulatory referral/consult to Internal Medicine; Future    Screening for cervical cancer  -     Liquid-Based Pap Smear, Screening  -     HPV High Risk Genotypes, PCR          Plan:  Routine annual exam with pap smear and breast exam today.  Discussed exam with patient - very atrophic cervix vs cuff noted, will get pelvic US to further evaluate status of uterus and cervix, as well as visualize adnexa due to fullness noted in RLQ.    STD screening today.  Will place referral to PCP through [facility].   Discussed menopausal symptoms.  Discussed r/b/a of HRT.  Will await results of US (will need progesterone if uterus still present).  Can treat if sx become too bothersome.   Counseling done, precautions given, all questions answered.  RTC 1 year for annual, or prn.

## 2024-09-04 LAB
C TRACH DNA SPEC QL NAA+PROBE: NOT DETECTED
N GONORRHOEA DNA SPEC QL NAA+PROBE: NOT DETECTED

## 2024-09-19 ENCOUNTER — PATIENT MESSAGE (OUTPATIENT)
Dept: PRIMARY CARE CLINIC | Facility: CLINIC | Age: 59
End: 2024-09-19
Payer: COMMERCIAL

## 2024-10-08 DIAGNOSIS — J43.2 CENTRILOBULAR EMPHYSEMA: Primary | ICD-10-CM

## 2024-10-08 RX ORDER — BUDESONIDE, GLYCOPYRROLATE, AND FORMOTEROL FUMARATE 160; 9; 4.8 UG/1; UG/1; UG/1
2 AEROSOL, METERED RESPIRATORY (INHALATION) 2 TIMES DAILY
Qty: 10.7 G | Refills: 4 | Status: SHIPPED | OUTPATIENT
Start: 2024-10-08

## 2024-10-22 ENCOUNTER — PATIENT MESSAGE (OUTPATIENT)
Dept: RESEARCH | Facility: HOSPITAL | Age: 59
End: 2024-10-22
Payer: COMMERCIAL

## 2024-11-04 ENCOUNTER — NURSE TRIAGE (OUTPATIENT)
Dept: ADMINISTRATIVE | Facility: CLINIC | Age: 59
End: 2024-11-04
Payer: COMMERCIAL

## 2024-11-04 ENCOUNTER — TELEPHONE (OUTPATIENT)
Dept: PRIMARY CARE CLINIC | Facility: CLINIC | Age: 59
End: 2024-11-04
Payer: COMMERCIAL

## 2024-11-04 NOTE — TELEPHONE ENCOUNTER
Pt called with c/o intermittent chest pain, elevated BP and SOB. Pt reports last /109. Care advice recommends pt to call 911 now, pt verbalized understanding.  Reason for Disposition   SEVERE difficulty breathing (e.g., struggling for each breath, speaks in single words)    Protocols used: Chest Pain-A-[place]

## 2024-11-04 NOTE — TELEPHONE ENCOUNTER
----- Message from Arias sent at 11/4/2024 12:53 PM CST -----  Name Of Caller: Stefanie        Provider Name:        Does patient feel the need to be seen today? yes        Relationship to the Pt?: patient        Contact Preference?:[phone]        What is the nature of the call?: Patient was instructed to call 911, patient states that she is going to  herself to the emergency room.     Symptom: High Blood Pressure - Caller Reports  Outcome: Instruct patient to Call 911 NOW!  Reason: Chest pain    The caller rejected this outcome.

## 2024-11-05 ENCOUNTER — TELEPHONE (OUTPATIENT)
Dept: PRIMARY CARE CLINIC | Facility: CLINIC | Age: 59
End: 2024-11-05
Payer: COMMERCIAL

## 2024-11-05 NOTE — TELEPHONE ENCOUNTER
----- Message from Kiera sent at 11/5/2024 12:15 PM CST -----  Type:  Same Day Appointment Request    Caller is requesting a same day appointment.  Caller declined first available appointment listed below.    Name of Caller:pt  When is the first available appointment?n/a  Symptoms: BP is now 163/98. my heart is racing and spikes in headaches.  Best Call Back Number: [phone]  Additional Information:

## 2024-11-05 NOTE — TELEPHONE ENCOUNTER
----- Message from Darlin sent at 11/5/2024  9:06 AM CST -----  Contact: [phone]  Pt is requesting this through the portal please advise     Preferred Date Range: Any    Preferred Times: Any Time    Reason for visit: Annual Check-up    Comments:  BP not normal.  11/4 AM  1st reading 4:/114  2nd reading 5:/101

## 2024-11-05 NOTE — TELEPHONE ENCOUNTER
----- Message from Edilson sent at 11/5/2024 11:18 AM CST -----  Regarding: Patient Callback  Contact: Pt [phone]  Type: Returning a call    Who left a message? Roxana    When did the practice call? Today    Does patient know what this is regarding: Appointment    Would the patient rather a call back or a response via [facility]? Call    Comments:

## 2024-11-06 ENCOUNTER — OFFICE VISIT (OUTPATIENT)
Dept: PRIMARY CARE CLINIC | Facility: CLINIC | Age: 59
End: 2024-11-06
Payer: COMMERCIAL

## 2024-11-06 VITALS
RESPIRATION RATE: 16 BRPM | DIASTOLIC BLOOD PRESSURE: 98 MMHG | HEIGHT: 67 IN | WEIGHT: 181.69 LBS | OXYGEN SATURATION: 95 % | BODY MASS INDEX: 28.52 KG/M2 | SYSTOLIC BLOOD PRESSURE: 156 MMHG | HEART RATE: 87 BPM | TEMPERATURE: 99 F

## 2024-11-06 DIAGNOSIS — I10 HYPERTENSION, UNSPECIFIED TYPE: ICD-10-CM

## 2024-11-06 DIAGNOSIS — Z76.89 ENCOUNTER TO ESTABLISH CARE: Primary | ICD-10-CM

## 2024-11-06 DIAGNOSIS — B35.1 ONYCHOMYCOSIS: ICD-10-CM

## 2024-11-06 DIAGNOSIS — M54.50 ACUTE RIGHT-SIDED LOW BACK PAIN WITHOUT SCIATICA: ICD-10-CM

## 2024-11-06 PROCEDURE — 99999 PR PBB SHADOW E&M-EST. PATIENT-LVL V: CPT | Mod: PBBFAC,,, | Performed by: NURSE PRACTITIONER

## 2024-11-06 PROCEDURE — 3008F BODY MASS INDEX DOCD: CPT | Mod: CPTII,S$GLB,, | Performed by: NURSE PRACTITIONER

## 2024-11-06 PROCEDURE — 3080F DIAST BP >= 90 MM HG: CPT | Mod: CPTII,S$GLB,, | Performed by: NURSE PRACTITIONER

## 2024-11-06 PROCEDURE — 1159F MED LIST DOCD IN RCRD: CPT | Mod: CPTII,S$GLB,, | Performed by: NURSE PRACTITIONER

## 2024-11-06 PROCEDURE — 3077F SYST BP >= 140 MM HG: CPT | Mod: CPTII,S$GLB,, | Performed by: NURSE PRACTITIONER

## 2024-11-06 PROCEDURE — 99203 OFFICE O/P NEW LOW 30 MIN: CPT | Mod: S$GLB,,, | Performed by: NURSE PRACTITIONER

## 2024-11-06 RX ORDER — AMLODIPINE BESYLATE 5 MG/1
2.5 TABLET ORAL DAILY
Qty: 45 TABLET | Refills: 1 | Status: SHIPPED | OUTPATIENT
Start: 2024-11-06 | End: 2024-12-06

## 2024-11-06 RX ORDER — TRAZODONE HYDROCHLORIDE 50 MG/1
50 TABLET ORAL NIGHTLY
Qty: 30 TABLET | Refills: 11 | Status: SHIPPED | OUTPATIENT
Start: 2024-11-06 | End: 2025-11-06

## 2024-11-06 NOTE — PROGRESS NOTES
Assessment:       1. Encounter to establish care    2. Hypertension, unspecified type    3. Onychomycosis    4. Acute right-sided low back pain without sciatica         Plan:       Encounter to establish care  -     CBC Auto Differential; Future; Expected date: 11/06/2024  -     Comprehensive Metabolic Panel; Future; Expected date: 11/06/2024  -     Lipid Panel; Future; Expected date: 11/06/2024  -     TSH; Future; Expected date: 11/06/2024  -     Hemoglobin A1C; Future; Expected date: 11/06/2024  -     Hepatitis C Antibody; Future; Expected date: 11/06/2024    Hypertension, unspecified type  -     amLODIPine (NORVASC) 5 MG tablet; Take 0.5 tablets (2.5 mg total) by mouth once daily. for 30 doses  Dispense: 45 tablet; Refill: 1  Decrease amlodipine to 2.5 mg daily. Take daily not PRN. F/u two weeks for blood pressure check.     Onychomycosis  -     Ambulatory referral/consult to Podiatry; Future; Expected date: 11/13/2024    Acute right-sided low back pain without sciatica  -     X-Ray Lumbar Spine 2 Or 3 Views; Future; Expected date: 11/06/2024    Other orders  -     traZODone (DESYREL) 50 MG tablet; Take 1 tablet (50 mg total) by mouth every evening.  Dispense: 30 tablet; Refill: 11      Assessment & Plan    Assessed elevated blood pressure and anxiety symptoms  Evaluated sleep issues and their impact on overall health  Considered options for managing anxiety, including both pharmacological and non-pharmacological approaches  Assessed lower back pain, considering potential causes and treatment options    ANXIETY:  - Explained the difference between functional and dysfunctional anxiety.  - Discussed the importance of sleep for overall wellness.    SLEEP DISORDER:  - Stefanie to implement sleep hygiene practices to improve sleep quality.  - Started Trazodone for sleep.  - Start with half a tablet and increase to up to 3 tablets as needed.  - Continued Melatonin as currently used.    LOWER BACK PAIN:  - Educated on  the connection between lower back pain and nerve pathways.  - Recommend performing lower back stretches to alleviate hip/back pain.  - Ordered X-ray of lower back.    HYPERTENSION:  - Explained that wrist blood pressure cuffs tend to read elevated compared to arm cuffs.  - Stefanie to practice relaxation techniques before taking blood pressure measurements.  - Decreased Amlodipine to half a tablet daily, to be taken at 9:00 AM or 6:00 AM.    LABS:  - Ordered labs for liver enzymes, diabetes screening, and cholesterol.    TOENAIL EVALUATION:  - Referred to Dr. Ordonez for toenail evaluation and treatment.    FOLLOW UP:  - Follow up in 2 weeks to reassess blood pressure control and medication efficacy.  - Contact the office if experiencing any adverse effects from the new medication regimen.  - Send a message through the patient portal regarding blood pressure readings and any concerns.       Medication List with Changes/Refills   New Medications    TRAZODONE (DESYREL) 50 MG TABLET    Take 1 tablet (50 mg total) by mouth every evening.   Current Medications    ALBUTEROL (PROVENTIL/VENTOLIN HFA) 90 MCG/ACTUATION INHALER    INHALE 2 PUFFS BY MOUTH EVERY 6 HOURS UNTIL DIRECTED TO STOP. TAKE AS NEEDED FOR COUGH, WHEEZING    ALBUTEROL SULFATE 90 MCG/ACTUATION AEBS    Inhale 2 puffs every 4 hours by inhalation route.    BUDESONIDE-GLYCOPYR-FORMOTEROL (BREZTRI AEROSPHERE) 160-9-4.8 MCG/ACTUATION HFAA    Inhale 2 puffs into the lungs 2 (two) times daily.    HYDROXYZINE HCL (ATARAX) 10 MG TAB    Take 1 tablet (10 mg total) by mouth 3 (three) times daily as needed (anxiety).    MULTIVITAMIN (THERAGRAN) PER TABLET    Take 1 tablet by mouth once daily.   Changed and/or Refilled Medications    Modified Medication Previous Medication    AMLODIPINE (NORVASC) 5 MG TABLET amLODIPine (NORVASC) 5 MG tablet       Take 0.5 tablets (2.5 mg total) by mouth once daily. for 30 doses    Take 1 tablet (5 mg total) by mouth once daily. for 30 doses    Discontinued Medications    AMOXICILLIN (AMOXIL) 500 MG TAB    TAKE 1 TABLET BY MOUTH EVERY 12 HOURS FOR 10 DAYS    BENZONATATE (TESSALON) 100 MG CAPSULE    TAKE 1 TO 2 CAPSULES BY MOUTH THREE TIMES DAILY AS NEEDED UNTIL DIRECTED TO STOP    BENZONATATE (TESSALON) 200 MG CAPSULE    TAKE 1 CAPSULE BY MOUTH EVERY 8 HOURS AS NEEDED FOR COUGH    CICLOPIROX (PENLAC) 8 % SOLN    ciclopirox 8 % topical solution    DOXYCYCLINE (VIBRAMYCIN) 100 MG CAP    Take 1 capsule by mouth 2 (two) times daily.    IBUPROFEN (ADVIL,MOTRIN) 100 MG TABLET    Take 100 mg by mouth every 6 (six) hours as needed for Temperature greater than. PRN HEADACHES    IBUPROFEN (ADVIL,MOTRIN) 800 MG TABLET    Take 1 tablet by mouth every 6 (six) hours as needed.    MECLIZINE (ANTIVERT) 25 MG TABLET    meclizine 25 mg tablet   Take 1 tablet 3 times a day by oral route as needed.    NITROFURANTOIN, MACROCRYSTAL-MONOHYDRATE, (MACROBID) 100 MG CAPSULE    TAKE 1 CAPSULE BY MOUTH TWICE DAILY FOR 5 DAYS    PREDNISONE (DELTASONE) 20 MG TABLET    TAKE 2 TABLETS BY MOUTH ONCE DAILY UNTIL DIRECTED TO STOP         Subjective:    Patient ID: Stefanie Peña is a 59 y.o. female.  Chief Complaint: er f/u htn    HPI  History of Present Illness    CHIEF COMPLAINT:  Stefanie presents today for follow-up on multiple health concerns.    HISTORY OF PRESENT ILLNESS:  She reports feeling unwell for approximately two months, experiencing shortness of breath, significant fatigue, and overall malaise. She has had episodes of progressively worsening confusion, manifesting as difficulty maintaining conversations and making errors in routine work tasks. She also reports profuse sweating without exertion and blurred vision. These symptoms collectively led to an emergency room visit when they became more severe.    CARDIOVASCULAR:  She was diagnosed with high blood pressure in the emergency room, despite previously having normal blood pressure. She recalls an incident over the  summer when she had elevated blood pressure at an urgent care visit for strep throat. Home blood pressure readings range from 140s to 160s systolic, with one lower reading of 105 associated with feeling more relaxed.    RESPIRATORY:  She experiences shortness of breath, which she attributes to her 35-year history of smoking.    NEUROLOGICAL:  She reports occasional shooting headaches without associated numbness in her feet or other related symptoms.    MUSCULOSKELETAL:  She experiences hip and lower back pain, particularly when getting up and walking. She also mentions having arthritis. The pain is localized to the lower back region, and she denies pain in the hip joint itself.    SLEEP:  She reports inconsistent sleep patterns with difficulty staying asleep, often waking up throughout the night. She uses melatonin, which helps her fall asleep initially but does not aid in maintaining sleep.    PSYCHIATRIC:  She experiences anxiety, describing feeling stressed and nervous, though specific triggers were not elaborated upon. She has a family history of bipolar disorder on the maternal side, likely bipolar I.    SUBSTANCE USE:  She has a 35-year history of smoking. She consumes alcohol, primarily wine, with regular weekend drinking and occasional weekday consumption, typically up to one bottle when she drinks. She notes that alcohol consumption has not been making her feel well in recent months. She uses CBD through inhalation for relaxation after stressful days. She occasionally uses Xanax obtained from family members for anxiety relief but tries to limit its use.    MEDICATIONS AND SUPPLEMENTS:  She uses melatonin for sleep and occasionally uses Xanax. She has switched from strong caffeine products to green tea and reports a history of using OTC caffeine powders in the past.    SOCIAL HISTORY:  She is currently attending school, focusing on remedial courses to further her education and career prospects. She recently  "initiated an exercise routine.    DIET:  She has made dietary modifications, including incorporating more fresh vegetables like arugula and spinach into her diet. She has also started consuming canned beets and expresses interest in learning to cook them fresh.    DERMATOLOGICAL:  She reports concerns about her toenails  , describing them as thick and painful, causing embarrassment. She expresses interest in treatment options for her toenail condition.    ENDOCRINE:  She reports experiencing occasional hot flashes, which she had previously attributed to menopause.    ROS:  Constitutional: +fatigue  Head: +headaches  Eyes: +vision changes  Respiratory: +shortness of breath  Musculoskeletal: -joint pain  Neurological: -numbness  Psychiatric: +anxiety, +sleep difficulty  Endocrine: -excessive sweating       Review of Systems    Objective:      Vitals:    11/06/24 1003   BP: (!) 156/98   BP Location: Left arm   Patient Position: Sitting   Pulse: 87   Resp: 16   Temp: 98.5 °F (36.9 °C)   TempSrc: Temporal   SpO2: 95%   Weight: 82.4 kg (181 lb 10.5 oz)   Height: 5' 7" (1.702 m)     BP Readings from Last 5 Encounters:   11/06/24 (!) 156/98   11/04/24 (!) 145/89   09/03/24 (!) 142/100   03/17/22 126/80   06/22/21 (!) 149/83     Wt Readings from Last 5 Encounters:   11/06/24 82.4 kg (181 lb 10.5 oz)   11/04/24 81.6 kg (180 lb)   09/03/24 85.8 kg (189 lb 2.5 oz)   03/17/22 97.9 kg (215 lb 13.3 oz)   06/22/21 98 kg (216 lb)     Physical Exam  Physical Exam    Vitals: Blood pressure elevated.  General: No acute distress. Well-developed. Well-nourished.  Eyes: EOMI. Sclerae anicteric.  HENT: Normocephalic. Atraumatic. Nares patent. Moist oral mucosa.  Ears: Bilateral TMs clear. Bilateral EACs clear.  Cardiovascular: Regular rate. Regular rhythm. No murmurs. No rubs. No gallops. Normal S1, S2.  Respiratory: Normal respiratory effort. Clear to auscultation bilaterally. No rales. No rhonchi. No wheezing.  Abdomen: Soft. Non-tender. " Non-distended. Normoactive bowel sounds.  Musculoskeletal: No  obvious deformity.  Extremities: No lower extremity edema.  Neurological: Alert & oriented x3. No slurred speech. Normal gait.  Psychiatric: Normal mood. Normal affect. Good insight. Good judgment.  Skin: Warm. Dry. No rash.         Lab Results   Component Value Date    WBC 7.61 11/06/2024    HGB 14.2 11/06/2024    HCT 42.3 11/06/2024     11/06/2024    CHOL 182 11/06/2024    TRIG 70 11/06/2024    HDL 79 (H) 11/06/2024    ALT 51 (H) 11/06/2024    AST 28 11/06/2024     11/06/2024    K 3.8 11/06/2024     11/06/2024    CREATININE 0.8 11/06/2024    BUN 11 11/06/2024    CO2 22 (L) 11/06/2024    TSH 2.182 11/06/2024      This note was generated with the assistance of ambient listening technology. Verbal consent was obtained by the patient and accompanying visitor(s) for the recording of patient appointment to facilitate this note. I attest to having reviewed and edited the generated note for accuracy, though some syntax or spelling errors may persist. Please contact the author of this note for any clarification.

## 2024-11-15 ENCOUNTER — OFFICE VISIT (OUTPATIENT)
Dept: PRIMARY CARE CLINIC | Facility: CLINIC | Age: 59
End: 2024-11-15
Payer: COMMERCIAL

## 2024-11-15 VITALS
DIASTOLIC BLOOD PRESSURE: 98 MMHG | HEART RATE: 99 BPM | HEIGHT: 67 IN | RESPIRATION RATE: 15 BRPM | WEIGHT: 180.56 LBS | OXYGEN SATURATION: 97 % | BODY MASS INDEX: 28.34 KG/M2 | SYSTOLIC BLOOD PRESSURE: 132 MMHG

## 2024-11-15 DIAGNOSIS — Z76.89 ENCOUNTER TO ESTABLISH CARE: ICD-10-CM

## 2024-11-15 DIAGNOSIS — I10 PRIMARY HYPERTENSION: Primary | ICD-10-CM

## 2024-11-15 PROCEDURE — 99999 PR PBB SHADOW E&M-EST. PATIENT-LVL IV: CPT | Mod: PBBFAC,,, | Performed by: NURSE PRACTITIONER

## 2024-11-15 RX ORDER — LOSARTAN POTASSIUM 50 MG/1
50 TABLET ORAL DAILY
Qty: 90 TABLET | Refills: 3 | Status: SHIPPED | OUTPATIENT
Start: 2024-11-15 | End: 2025-11-15

## 2024-11-15 NOTE — PROGRESS NOTES
Assessment:       1. Primary hypertension    2. Encounter to establish care         Plan:       Primary hypertension  -     losartan (COZAAR) 50 MG tablet; Take 1 tablet (50 mg total) by mouth once daily.  Dispense: 90 tablet; Refill: 3    Encounter to establish care  -     Ambulatory referral/consult to Internal Medicine    .  Amlodipine due to side effects including sedation and fatigue.  Trial of low-dose losartan.    Assessment & Plan    Assessed patient's response to amlodipine; noted some improvement in blood pressure but patient experiencing side effects  Will switch from amlodipine to losartan 50mg due to its minimal side effect profile  Evaluated recent lab work; all results within normal limits  Considered patient's recent lifestyle changes, including cessation of alcohol consumption, as potentially beneficial for overall health    HYPERTENSION:  Explained goal blood pressure for patient is less than 130/90 due to age and overall health.  Started losartan 50mg daily in the morning.  Discontinued amlodipine.  Recheck blood pressure in office.  Contact the office in about 2 weeks for provider to check on blood pressure at home.  Follow up for blood pressure checks if unable to obtain a home blood pressure monitor.    ANXIETY:  Discontinued hydroxyzine (Atarax) due to side effects.    INSOMNIA:  Decreased trazodone dose by 50%.    ALCOHOL USE:  Discussed potential benefits of alcohol cessation, including improved clarity, reduced bloating, and better mood.  Stefanie to continue abstaining from alcohol consumption.  Stefanie to maintain hydration.  Recommend considering journaling to track progress and feelings.    FOLLOW-UP:  Follow up in 1 year for annual lab work.       Medication List with Changes/Refills   New Medications    LOSARTAN (COZAAR) 50 MG TABLET    Take 1 tablet (50 mg total) by mouth once daily.   Current Medications    ALBUTEROL (PROVENTIL/VENTOLIN HFA) 90 MCG/ACTUATION INHALER    INHALE 2  PUFFS BY MOUTH EVERY 6 HOURS UNTIL DIRECTED TO STOP. TAKE AS NEEDED FOR COUGH, WHEEZING    ALBUTEROL SULFATE 90 MCG/ACTUATION AEBS    Inhale 2 puffs every 4 hours by inhalation route.    BUDESONIDE-GLYCOPYR-FORMOTEROL (BREZTRI AEROSPHERE) 160-9-4.8 MCG/ACTUATION HFAA    Inhale 2 puffs into the lungs 2 (two) times daily.    MULTIVITAMIN (THERAGRAN) PER TABLET    Take 1 tablet by mouth once daily.    TRAZODONE (DESYREL) 50 MG TABLET    Take 1 tablet (50 mg total) by mouth every evening.   Discontinued Medications    AMLODIPINE (NORVASC) 5 MG TABLET    Take 0.5 tablets (2.5 mg total) by mouth once daily. for 30 doses    HYDROXYZINE HCL (ATARAX) 10 MG TAB    Take 1 tablet (10 mg total) by mouth 3 (three) times daily as needed (anxiety).         Subjective:    Patient ID: Stefanie Peña is a 59 y.o. female.  Chief Complaint: Follow-up    HPI  History of Present Illness    CHIEF COMPLAINT:  Stefanie presents for a follow-up visit to discuss blood pressure medication and its side effects.    HPI:  Stefanie reports side effects from amlodipine, initiated during a recent hospitalization. The practitioner had previously instructed the patient to take half the prescribed dose. Stefanie describes anxiety and episodes of feeling unwell and sweaty, though she notes this occurred before starting the medication as well. She reports having periods of feeling well followed by sudden onset of symptoms. Stefanie has been taking the medication for approximately 2 weeks and feels this may not be sufficient time to determine its effectiveness. She expresses concern about cognitive function at work, describing periods of drowsiness alternating with bursts of energy. Stefanie's blood pressure has shown some improvement since starting the medication. She stopped alcohol consumption on November 2nd and reports feeling less bloated with improved mental clarity since then. Prior to cessation, she consumed a can of Truly almost nightly and  "about a bottle of wine over the weekend, though this varied with stress levels.    MEDICATIONS:  Stefanie started Amlodipine 2.5 mg daily for blood pressure two weeks ago. She is also on Trazodone for sleep and Atarax (Hydroxyzine) for anxiety.    MEDICAL HISTORY:  Stefanie has a history of hypertension and anxiety.    TEST RESULTS:  Recent lab results show cholesterol levels within normal limits. Diabetes screening was negative. Thyroid panel was within normal limits, and hepatitis screening was negative.    SOCIAL HISTORY:  Stefanie stopped alcohol consumption on November 2nd. Prior to that, she consumed a can of Truly nightly or every other night, and at least a bottle of wine over the weekend.      ROS:  Psychiatric: +anxiety, + fatigue  Endocrine: +excessive sweating       Review of Systems    Objective:      Vitals:    11/15/24 0805   BP: (!) 132/98   BP Location: Right arm   Patient Position: Sitting   Pulse: 99   Resp: 15   SpO2: 97%   Weight: 81.9 kg (180 lb 8.9 oz)   Height: 5' 7" (1.702 m)     BP Readings from Last 5 Encounters:   11/15/24 (!) 132/98   11/06/24 (!) 156/98   11/04/24 (!) 145/89   09/03/24 (!) 142/100   03/17/22 126/80     Wt Readings from Last 5 Encounters:   11/15/24 81.9 kg (180 lb 8.9 oz)   11/06/24 82.4 kg (181 lb 10.5 oz)   11/04/24 81.6 kg (180 lb)   09/03/24 85.8 kg (189 lb 2.5 oz)   03/17/22 97.9 kg (215 lb 13.3 oz)     Physical Exam  Physical Exam    Vitals: Blood pressure elevated.  General: No acute distress. Well-developed. Well-nourished.  Eyes: EOMI. Sclerae anicteric.  HENT: Normocephalic. Atraumatic. Nares patent. Moist oral mucosa.  Ears: Bilateral TMs clear. Bilateral EACs clear.  Cardiovascular: Regular rate. Regular rhythm. No murmurs. No rubs. No gallops. Normal S1, S2.  Respiratory: Normal respiratory effort. Clear to auscultation bilaterally. No rales. No rhonchi. No wheezing.  Abdomen: Soft. Non-tender. Non-distended. Normoactive bowel sounds.  Musculoskeletal: No  " obvious deformity.  Extremities: No lower extremity edema.  Neurological: Alert & oriented x3. No slurred speech. Normal gait.  Psychiatric: Normal mood. Normal affect. Good insight. Good judgment.  Skin: Warm. Dry. No rash.         Lab Results   Component Value Date    WBC 7.61 11/06/2024    HGB 14.2 11/06/2024    HCT 42.3 11/06/2024     11/06/2024    CHOL 182 11/06/2024    TRIG 70 11/06/2024    HDL 79 (H) 11/06/2024    ALT 51 (H) 11/06/2024    AST 28 11/06/2024     11/06/2024    K 3.8 11/06/2024     11/06/2024    CREATININE 0.8 11/06/2024    BUN 11 11/06/2024    CO2 22 (L) 11/06/2024    TSH 2.182 11/06/2024    HGBA1C 4.8 11/06/2024      This note was generated with the assistance of ambient listening technology. Verbal consent was obtained by the patient and accompanying visitor(s) for the recording of patient appointment to facilitate this note. I attest to having reviewed and edited the generated note for accuracy, though some syntax or spelling errors may persist. Please contact the author of this note for any clarification.

## 2024-12-19 ENCOUNTER — PATIENT MESSAGE (OUTPATIENT)
Dept: ADMINISTRATIVE | Facility: HOSPITAL | Age: 59
End: 2024-12-19
Payer: COMMERCIAL

## 2025-01-25 ENCOUNTER — ON-DEMAND VIRTUAL (OUTPATIENT)
Dept: URGENT CARE | Facility: CLINIC | Age: 60
End: 2025-01-25
Payer: COMMERCIAL

## 2025-01-25 DIAGNOSIS — M54.32 SCIATIC PAIN, LEFT: Primary | ICD-10-CM

## 2025-01-25 RX ORDER — CYCLOBENZAPRINE HCL 10 MG
10 TABLET ORAL 3 TIMES DAILY PRN
Qty: 15 TABLET | Refills: 0 | Status: SHIPPED | OUTPATIENT
Start: 2025-01-25 | End: 2025-01-30

## 2025-01-25 NOTE — PROGRESS NOTES
Subjective:      Patient ID: Stefanie Peña is a 59 y.o. female.    Vitals:  vitals were not taken for this visit.     Chief Complaint: Back Pain      Visit Type: TELE AUDIOVISUAL    Patient Location: Home     Present with the patient at the time of consultation: TELEMED PRESENT WITH PATIENT: None    Past Medical History:   Diagnosis Date    Bronchitis      Past Surgical History:   Procedure Laterality Date    CERVICAL BIOPSY  W/ LOOP ELECTRODE EXCISION  09/09/2015     Review of patient's allergies indicates:  No Known Allergies  Current Outpatient Medications on File Prior to Visit   Medication Sig Dispense Refill    albuterol (PROVENTIL/VENTOLIN HFA) 90 mcg/actuation inhaler INHALE 2 PUFFS BY MOUTH EVERY 6 HOURS UNTIL DIRECTED TO STOP. TAKE AS NEEDED FOR COUGH, WHEEZING      albuterol sulfate 90 mcg/actuation aebs Inhale 2 puffs every 4 hours by inhalation route.      budesonide-glycopyr-formoterol (BREZTRI AEROSPHERE) 160-9-4.8 mcg/actuation HFAA Inhale 2 puffs into the lungs 2 (two) times daily. 10.7 g 4    losartan (COZAAR) 50 MG tablet Take 1 tablet (50 mg total) by mouth once daily. 90 tablet 3    multivitamin (THERAGRAN) per tablet Take 1 tablet by mouth once daily.      traZODone (DESYREL) 50 MG tablet Take 1 tablet (50 mg total) by mouth every evening. 30 tablet 11     No current facility-administered medications on file prior to visit.     Family History   Problem Relation Name Age of Onset    Breast cancer Neg Hx      Colon cancer Neg Hx      Ovarian cancer Neg Hx         Medications Ordered                [facility] [place] - [address]   [address] [place] [place] [address]    Telephone: [phone]   Fax: [phone]   Hours: Not open 24 hours                         E-Prescribed (1 of 1)              cyclobenzaprine (FLEXERIL) 10 MG tablet    Sig: Take 1 tablet (10 mg total) by mouth 3 (three) times daily as needed for Muscle spasms.       Start:  1/25/25     Quantity: 15 tablet Refills: 0                           Ohs Peq Odvv Intake    1/25/2025  7:19 AM CST - Filed by Patient   What is your current physical address in the event of a medical emergency? [address]   Are you able to take your vital signs? No   Please attach any relevant images or files    Is your employer contracted with [facility]? No         Did a squat yesterday and when she shot back up she felt pain in buttock that shoots down leg. This is her second time of getting sciatic pain.      Back Pain  Pertinent negatives include no fever.       Constitution: Negative for fatigue and fever.   Musculoskeletal:  Positive for back pain.        Objective:   The physical exam was conducted virtually.  Physical Exam   Abdominal: Normal appearance.   Neurological: She is alert.       Assessment:     1. Sciatic pain, left        Plan:       Sciatic pain, left    Other orders  -     cyclobenzaprine (FLEXERIL) 10 MG tablet; Take 1 tablet (10 mg total) by mouth 3 (three) times daily as needed for Muscle spasms.  Dispense: 15 tablet; Refill: 0

## 2025-01-25 NOTE — PATIENT INSTRUCTIONS
If pain persists follow up with your primary.     Thank you for choosing [facility]!    Our goal in the [facility] to always provide outstanding medical care. You may receive a survey by mail or e-mail in the next week regarding your experience today. We would greatly appreciate you completing and returning the survey. Your feedback provides us with a way to recognize our staff who provide very good care, and it helps us learn how to improve when your experience was below our aspiration of excellence.         We appreciate you trusting us with your medical care. We hope you feel better soon. We will be happy to take care of you for all of your future medical needs.    You must understand that you've received Virtual  treatment only and that you may be released before all your medical problems are known or treated. You, the patient, will arrange for follow up care as instructed.    Follow up with your PCP or specialty clinic as directed in the next 1-2 weeks if not improved or as needed.  You can call [phone] to schedule an appointment with the appropriate provider.    If your condition worsens we recommend that you receive another evaluation in person, with your primary care provider, urgent care or at the emergency room immediately or contact your primary medical clinics after hours call service to discuss your concerns.         Vaginal birth after  () Vaginal birth after  ()

## 2025-02-02 ENCOUNTER — ON-DEMAND VIRTUAL (OUTPATIENT)
Dept: URGENT CARE | Facility: CLINIC | Age: 60
End: 2025-02-02
Payer: COMMERCIAL

## 2025-02-02 DIAGNOSIS — M54.32 SCIATIC PAIN, LEFT: Primary | ICD-10-CM

## 2025-02-02 PROCEDURE — 98005 SYNCH AUDIO-VIDEO EST LOW 20: CPT | Mod: 95,,, | Performed by: NURSE PRACTITIONER

## 2025-02-02 RX ORDER — METHYLPREDNISOLONE 4 MG/1
TABLET ORAL
Qty: 21 EACH | Refills: 0 | Status: SHIPPED | OUTPATIENT
Start: 2025-02-02 | End: 2025-02-05

## 2025-02-02 NOTE — PROGRESS NOTES
Subjective:      Patient ID: Stefanie Peña is a 59 y.o. female.    Vitals:  vitals were not taken for this visit.     Chief Complaint: Back Pain      Visit Type: TELE AUDIOVISUAL - This visit was conducted virtually based on  subjective information and limited objective exam    Present with the patient at the time of consultation: TELEMED PRESENT WITH PATIENT: None  LOCATION OF PATIENT [facility]  Two patient identifiers used to verify patient- saying out date of birth and full name.       Past Medical History:   Diagnosis Date    Bronchitis      Past Surgical History:   Procedure Laterality Date    CERVICAL BIOPSY  W/ LOOP ELECTRODE EXCISION  09/09/2015     Review of patient's allergies indicates:  No Known Allergies  Current Outpatient Medications on File Prior to Visit   Medication Sig Dispense Refill    albuterol (PROVENTIL/VENTOLIN HFA) 90 mcg/actuation inhaler INHALE 2 PUFFS BY MOUTH EVERY 6 HOURS UNTIL DIRECTED TO STOP. TAKE AS NEEDED FOR COUGH, WHEEZING      albuterol sulfate 90 mcg/actuation aebs Inhale 2 puffs every 4 hours by inhalation route.      budesonide-glycopyr-formoterol (BREZTRI AEROSPHERE) 160-9-4.8 mcg/actuation HFAA Inhale 2 puffs into the lungs 2 (two) times daily. 10.7 g 4    losartan (COZAAR) 50 MG tablet Take 1 tablet (50 mg total) by mouth once daily. 90 tablet 3    multivitamin (THERAGRAN) per tablet Take 1 tablet by mouth once daily.      traZODone (DESYREL) 50 MG tablet Take 1 tablet (50 mg total) by mouth every evening. 30 tablet 11     No current facility-administered medications on file prior to visit.     Family History   Problem Relation Name Age of Onset    Breast cancer Neg Hx      Colon cancer Neg Hx      Ovarian cancer Neg Hx         Medications Ordered                [id number] - [place] - [address]   [address] [place] [address]    Telephone: [phone]   Fax: [phone]   Hours: Not open 24 hours                          Unspecified Transmission Method (1 of 1)              methylPREDNISolone (MEDROL DOSEPACK) 4 mg tablet    Sig: use as directed       Start: 2/2/25     Quantity: 21 each Refills: 0                           Ohs Peq Odvv Intake    2/2/2025 11:48 AM CST - Filed by Patient   What is your current physical address in the event of a medical emergency? [address]   Are you able to take your vital signs? No   Please attach any relevant images or files    Is your employer contracted with [facility]? No         59 female with c/o sciatica for over one week. She did a virtual visit on 1/25. She states she has hx of degenerative changes to back. She states she continues left sided pain with radiation. She states she took muscle relaxers and anti inflammatory. She states she improved and went back to work. She states symptoms worse when getting in car and sitting.         Constitution: Negative.   HENT: Negative.     Cardiovascular: Negative.    Respiratory: Negative.     Gastrointestinal: Negative.    Endocrine: negative.   Genitourinary: Negative.  Negative for frequency and urgency.   Musculoskeletal:  Positive for back pain.   Skin: Negative.    Allergic/Immunologic: Negative.    Neurological: Negative.    Hematologic/Lymphatic: Negative.    Psychiatric/Behavioral: Negative.          Objective:   The physical exam was conducted virtually.    AAO x 3 ; no acute distress noted; appearance normal; mood and behavior normal; thought process normal  Head- normocephalic  Nose- appears normal, no discharge or erythema  Eyes- pupils appear normal in size, no drainage, no erythema  Ears- normal appearing; no discharge, no erythema  Mouth- appears normal  Oropharynx- no erythema, lesions  Lungs- breathing at a normal rate, no acute distress noted  Heart- no reports of tachycardia, palpitations, chest pain  Abdomen- non distended, non tender reported by patient  Skin- warm and dry, no erythema or edema  noted by patient or visualized  Psych- as above; no si/hi      Assessment:     1. Sciatic pain, left        Plan:     Call disconnected. I explained to patient that she needs in person visit for contnued symptoms. Will try medrol dose pack     Thank you for choosing [facility]!    Our goal in the [facility] is to always provide outstanding medical care. You may receive a survey by mail or e-mail in the next week regarding your experience today. We would greatly appreciate you completing and returning the survey. Your feedback provides us with a way to recognize our staff who provide very good care, and it helps us learn how to improve when your experience was below our aspiration of excellence.         We appreciate you trusting us with your medical care. We hope you feel better soon. We will be happy to take care of you for all of your future medical needs.    You must understand that you've received an Urgent Care treatment only and that you may be released before all your medical problems are known or treated. You, the patient, will arrange for follow up care as instructed.    Follow up with your PCP or specialty clinic as directed in the next 1-2 weeks if not improved or as needed.  You can call [phone] to schedule an appointment with the appropriate provider.    If your condition worsens we recommend that you receive another evaluation in person, with your primary care provider, urgent care or at the emergency room immediately or contact your primary medical clinics after hours call service to discuss your concerns.         Sciatic pain, left  -     methylPREDNISolone (MEDROL DOSEPACK) 4 mg tablet; use as directed  Dispense: 21 each; Refill: 0

## 2025-02-05 ENCOUNTER — OFFICE VISIT (OUTPATIENT)
Dept: PRIMARY CARE CLINIC | Facility: CLINIC | Age: 60
End: 2025-02-05
Payer: COMMERCIAL

## 2025-02-05 VITALS
RESPIRATION RATE: 17 BRPM | HEART RATE: 101 BPM | WEIGHT: 168.63 LBS | OXYGEN SATURATION: 97 % | HEIGHT: 67 IN | TEMPERATURE: 97 F | DIASTOLIC BLOOD PRESSURE: 60 MMHG | BODY MASS INDEX: 26.47 KG/M2 | SYSTOLIC BLOOD PRESSURE: 126 MMHG

## 2025-02-05 DIAGNOSIS — M54.41 ACUTE RIGHT-SIDED LOW BACK PAIN WITH RIGHT-SIDED SCIATICA: Primary | ICD-10-CM

## 2025-02-05 DIAGNOSIS — J43.2 CENTRILOBULAR EMPHYSEMA: ICD-10-CM

## 2025-02-05 DIAGNOSIS — I10 ESSENTIAL HYPERTENSION: ICD-10-CM

## 2025-02-05 PROCEDURE — 99999 PR PBB SHADOW E&M-EST. PATIENT-LVL III: CPT | Mod: PBBFAC,,, | Performed by: NURSE PRACTITIONER

## 2025-02-05 PROCEDURE — 1159F MED LIST DOCD IN RCRD: CPT | Mod: CPTII,S$GLB,, | Performed by: NURSE PRACTITIONER

## 2025-02-05 PROCEDURE — 3008F BODY MASS INDEX DOCD: CPT | Mod: CPTII,S$GLB,, | Performed by: NURSE PRACTITIONER

## 2025-02-05 PROCEDURE — 3074F SYST BP LT 130 MM HG: CPT | Mod: CPTII,S$GLB,, | Performed by: NURSE PRACTITIONER

## 2025-02-05 PROCEDURE — 96372 THER/PROPH/DIAG INJ SC/IM: CPT | Mod: S$GLB,,, | Performed by: NURSE PRACTITIONER

## 2025-02-05 PROCEDURE — 99214 OFFICE O/P EST MOD 30 MIN: CPT | Mod: 25,S$GLB,, | Performed by: NURSE PRACTITIONER

## 2025-02-05 PROCEDURE — 3078F DIAST BP <80 MM HG: CPT | Mod: CPTII,S$GLB,, | Performed by: NURSE PRACTITIONER

## 2025-02-05 RX ORDER — AMLODIPINE BESYLATE 5 MG/1
5 TABLET ORAL DAILY
Qty: 90 TABLET | Refills: 3 | Status: SHIPPED | OUTPATIENT
Start: 2025-02-05 | End: 2026-02-05

## 2025-02-05 RX ORDER — CYCLOBENZAPRINE HCL 10 MG
10 TABLET ORAL 3 TIMES DAILY PRN
Qty: 30 TABLET | Refills: 0 | Status: SHIPPED | OUTPATIENT
Start: 2025-02-05 | End: 2025-02-15

## 2025-02-05 RX ORDER — BETAMETHASONE SODIUM PHOSPHATE AND BETAMETHASONE ACETATE 3; 3 MG/ML; MG/ML
12 INJECTION, SUSPENSION INTRA-ARTICULAR; INTRALESIONAL; INTRAMUSCULAR; SOFT TISSUE
Status: COMPLETED | OUTPATIENT
Start: 2025-02-05 | End: 2025-02-05

## 2025-02-05 RX ORDER — GABAPENTIN 300 MG/1
300 CAPSULE ORAL NIGHTLY
Qty: 30 CAPSULE | Refills: 0 | Status: SHIPPED | OUTPATIENT
Start: 2025-02-05 | End: 2025-03-03

## 2025-02-05 RX ORDER — METHYLPREDNISOLONE 4 MG/1
TABLET ORAL
Qty: 1 EACH | Refills: 0 | Status: SHIPPED | OUTPATIENT
Start: 2025-02-05 | End: 2025-02-18 | Stop reason: SDUPTHER

## 2025-02-05 RX ADMIN — BETAMETHASONE SODIUM PHOSPHATE AND BETAMETHASONE ACETATE 12 MG: 3; 3 INJECTION, SUSPENSION INTRA-ARTICULAR; INTRALESIONAL; INTRAMUSCULAR; SOFT TISSUE at 10:02

## 2025-02-05 NOTE — PROGRESS NOTES
Assessment:       1. Acute right-sided low back pain with right-sided sciatica    2. Centrilobular emphysema    3. Essential hypertension         Plan:       Acute right-sided low back pain with right-sided sciatica  -     cyclobenzaprine (FLEXERIL) 10 MG tablet; Take 1 tablet (10 mg total) by mouth 3 (three) times daily as needed for Muscle spasms.  Dispense: 30 tablet; Refill: 0  -     betamethasone acetate-betamethasone sodium phosphate injection 12 mg  -     methylPREDNISolone (MEDROL DOSEPACK) 4 mg tablet; use as directed  Dispense: 1 each; Refill: 0  -     gabapentin (NEURONTIN) 300 MG capsule; Take 1 capsule (300 mg total) by mouth every evening.  Dispense: 30 capsule; Refill: 0  Imaging if no improvement in pain.     Centrilobular emphysema  Has since quit smoking.  Continue smoking cessation efforts    Essential hypertension  -     amLODIPine (NORVASC) 5 MG tablet; Take 1 tablet (5 mg total) by mouth once daily.  Dispense: 90 tablet; Refill: 3  Switch from losartan to amlodipine at patient request.  Will contact in 2 weeks for blood pressure check.    Assessment & Plan    IMPRESSION:  - Diagnosed sciatica based on patient's symptoms and history  - Decided on aggressive treatment approach due to patient's upcoming work demands  - Considered potential side effects of current blood pressure medication (Losartan) as contributing to leg pain - patient concerned that leg cramps are due to losartan. Will switch to amlodipine  - Opted for steroid injection and oral steroid Dosepak to rapidly reduce inflammation  - Chose to switch blood pressure medication to amlodipine to potentially alleviate leg pain side effects  - Prescribed multiple medication options for pain management, including muscle relaxants and nerve pain medication    SCIATICA:  - Evaluated the patient's condition and confirmed the diagnosis of sciatica based on reported symptoms.  - Administered Celestone injection (steroid) in office for immediate  relief.  - Prescribed Medrol Dosepak (steroid) to be started tomorrow and taken over 5 days.  - Prescribed a stronger muscle relaxant for nighttime use as needed.  - Initiated gabapentin for nerve pain, to be taken at night as needed.  - Ordered an x-ray to rule out compression fracture.  - Educated the patient about sciatica, describing it as inflammation of the sciatic nerve.  - Advised the patient to avoid yoga for 1 week and then resume slowly, discontinuing if pain radiates down the leg.  - Instructed the patient to report if symptoms persist or worsen despite the prescribed treatment.    HYPERTENSION:  - Evaluated the patient's blood pressure, noting improvement with home readings around 126/60.  - Discussed potential side effects of blood pressure medications with the patient.  - Discontinued Losartan due to patient's concerns about side effects.  - Prescribed amlodipine (calcium channel blocker) as a replacement for Losartan to control blood pressure.  - Instructed the patient to continue monitoring blood pressure at home and report any significant changes.    PAIN MANAGEMENT:  - Assessed the patient's heavy use of ibuprofen for pain management.  - Educated the patient about the impact of ibuprofen on blood pressure.  - Recommend alternative pain management options, including the prescribed steroid injection and muscle relaxants, to reduce reliance on NSAIDs.  - Advised the patient to gradually reduce NSAID use as other pain management strategies take effect.      YOGA SAFETY:  - Advised about the general safety of yoga for back problems, with precautions.  - Stefanie to avoid yoga for 1 week, then restart slowly.  - Stefanie to stop yoga if pain radiates down the leg during practice.  - Contact office if yoga causes pain radiating down the leg after 1 week.    FOLLOW UP:  - Scheduled follow-up for x-ray review on the 10th, or as convenient for the patient.       Medication List with Changes/Refills   New  Medications    AMLODIPINE (NORVASC) 5 MG TABLET    Take 1 tablet (5 mg total) by mouth once daily.    CYCLOBENZAPRINE (FLEXERIL) 10 MG TABLET    Take 1 tablet (10 mg total) by mouth 3 (three) times daily as needed for Muscle spasms.    GABAPENTIN (NEURONTIN) 300 MG CAPSULE    Take 1 capsule (300 mg total) by mouth every evening.    METHYLPREDNISOLONE (MEDROL DOSEPACK) 4 MG TABLET    use as directed   Current Medications    BUDESONIDE-GLYCOPYR-FORMOTEROL (BREZTRI AEROSPHERE) 160-9-4.8 MCG/ACTUATION HFAA    Inhale 2 puffs into the lungs 2 (two) times daily.    MULTIVITAMIN (THERAGRAN) PER TABLET    Take 1 tablet by mouth once daily.    TRAZODONE (DESYREL) 50 MG TABLET    Take 1 tablet (50 mg total) by mouth every evening.   Discontinued Medications    ALBUTEROL (PROVENTIL/VENTOLIN HFA) 90 MCG/ACTUATION INHALER    INHALE 2 PUFFS BY MOUTH EVERY 6 HOURS UNTIL DIRECTED TO STOP. TAKE AS NEEDED FOR COUGH, WHEEZING    ALBUTEROL SULFATE 90 MCG/ACTUATION AEBS    Inhale 2 puffs every 4 hours by inhalation route.    LOSARTAN (COZAAR) 50 MG TABLET    Take 1 tablet (50 mg total) by mouth once daily.    METHYLPREDNISOLONE (MEDROL DOSEPACK) 4 MG TABLET    use as directed         Subjective:    Patient ID: Stefanie Peña is a 59 y.o. female.  Chief Complaint: Sciatica    History of Present Illness    CHIEF COMPLAINT:  Stefanie presents today for left leg pain.    HISTORY OF PRESENT ILLNESS:  She initially injured her back while lifting her 50-pound dog over the holidays. A second incident occurred on January 24th during a squat movement, resulting in severe, debilitating pain requiring assistance leaving work. Pain radiates from the lower back down the left leg and worsens at night due to daytime activity. She sought treatment at urgent care twice, receiving muscle relaxers which were ineffective, followed by anti-inflammatory medication which provided minimal relief. She reports frequent use of ibuprofen for pain  "management.    MEDICATIONS:  She self-discontinued Losartan due to concerns about medication-induced leg pain after personal research. She reports taking blood pressure medication only on workdays. Home blood pressure readings are approximately 126.    SOCIAL HISTORY:  She practices yoga regularly, reporting mental and emotional benefits.       Review of Systems   Constitutional:  Negative for chills and fever.   HENT:  Negative for ear pain, nosebleeds, sinus pressure and sore throat.    Respiratory:  Negative for cough and shortness of breath.    Cardiovascular:  Negative for chest pain and palpitations.   Gastrointestinal:  Negative for abdominal pain, diarrhea, nausea and vomiting.   Genitourinary: Negative.  Negative for dysuria, hematuria and pelvic pain.   Musculoskeletal:  Positive for back pain.   Neurological:  Positive for numbness and headaches. Negative for weakness.   Psychiatric/Behavioral:  Negative for dysphoric mood and sleep disturbance. The patient is not nervous/anxious.        Objective:      Vitals:    02/05/25 1010 02/05/25 1232   BP: (!) 148/96 126/60   BP Location: Right arm    Patient Position: Sitting    Pulse: 101    Resp: 17    Temp: 97.1 °F (36.2 °C)    TempSrc: Temporal    SpO2: 97%    Weight: 76.5 kg (168 lb 10.4 oz)    Height: 5' 7" (1.702 m)      BP Readings from Last 5 Encounters:   02/05/25 126/60   11/15/24 (!) 132/98   11/06/24 (!) 156/98   11/04/24 (!) 145/89   09/03/24 (!) 142/100     Wt Readings from Last 5 Encounters:   02/05/25 76.5 kg (168 lb 10.4 oz)   11/15/24 81.9 kg (180 lb 8.9 oz)   11/06/24 82.4 kg (181 lb 10.5 oz)   11/04/24 81.6 kg (180 lb)   09/03/24 85.8 kg (189 lb 2.5 oz)     Physical Exam  Constitutional:       General: She is not in acute distress.     Appearance: Normal appearance. She is not ill-appearing.   HENT:      Head: Normocephalic.      Mouth/Throat:      Mouth: Mucous membranes are moist.      Pharynx: No pharyngeal swelling or oropharyngeal " exudate.      Tonsils: No tonsillar exudate.   Eyes:      Conjunctiva/sclera:      Right eye: Right conjunctiva is not injected.      Left eye: Left conjunctiva is not injected.   Cardiovascular:      Rate and Rhythm: Normal rate and regular rhythm.      Pulses:           Radial pulses are 1+ on the right side and 1+ on the left side.      Heart sounds: No murmur heard.     No systolic murmur is present.      No diastolic murmur is present.   Pulmonary:      Effort: No tachypnea or bradypnea.      Breath sounds: Normal breath sounds. No decreased breath sounds, wheezing, rhonchi or rales.   Abdominal:      General: There is no distension.   Musculoskeletal:      Right lower leg: No edema.      Left lower leg: No edema.   Skin:     General: Skin is warm and dry.   Neurological:      Mental Status: She is alert.   Psychiatric:         Attention and Perception: Attention normal.         Speech: Speech normal.         Behavior: Behavior normal.           Lab Results   Component Value Date    WBC 7.61 11/06/2024    HGB 14.2 11/06/2024    HCT 42.3 11/06/2024     11/06/2024    CHOL 182 11/06/2024    TRIG 70 11/06/2024    HDL 79 (H) 11/06/2024    ALT 51 (H) 11/06/2024    AST 28 11/06/2024     11/06/2024    K 3.8 11/06/2024     11/06/2024    CREATININE 0.8 11/06/2024    BUN 11 11/06/2024    CO2 22 (L) 11/06/2024    TSH 2.182 11/06/2024    HGBA1C 4.8 11/06/2024      This note was generated with the assistance of ambient listening technology. Verbal consent was obtained by the patient and accompanying visitor(s) for the recording of patient appointment to facilitate this note. I attest to having reviewed and edited the generated note for accuracy, though some syntax or spelling errors may persist. Please contact the author of this note for any clarification.

## 2025-02-13 ENCOUNTER — PATIENT MESSAGE (OUTPATIENT)
Dept: PRIMARY CARE CLINIC | Facility: CLINIC | Age: 60
End: 2025-02-13
Payer: COMMERCIAL

## 2025-02-18 ENCOUNTER — PATIENT MESSAGE (OUTPATIENT)
Dept: PRIMARY CARE CLINIC | Facility: CLINIC | Age: 60
End: 2025-02-18
Payer: COMMERCIAL

## 2025-02-18 DIAGNOSIS — M54.41 ACUTE RIGHT-SIDED LOW BACK PAIN WITH RIGHT-SIDED SCIATICA: ICD-10-CM

## 2025-02-18 RX ORDER — METHYLPREDNISOLONE 4 MG/1
TABLET ORAL
Qty: 1 EACH | Refills: 0 | Status: SHIPPED | OUTPATIENT
Start: 2025-02-18

## 2025-03-02 DIAGNOSIS — M54.41 ACUTE RIGHT-SIDED LOW BACK PAIN WITH RIGHT-SIDED SCIATICA: ICD-10-CM

## 2025-03-03 RX ORDER — GABAPENTIN 300 MG/1
300 CAPSULE ORAL
Qty: 90 CAPSULE | Refills: 1 | Status: SHIPPED | OUTPATIENT
Start: 2025-03-03